# Patient Record
Sex: MALE | URBAN - NONMETROPOLITAN AREA
[De-identification: names, ages, dates, MRNs, and addresses within clinical notes are randomized per-mention and may not be internally consistent; named-entity substitution may affect disease eponyms.]

---

## 2020-03-10 ENCOUNTER — APPOINTMENT (RX ONLY)
Dept: URBAN - NONMETROPOLITAN AREA CLINIC 4 | Facility: CLINIC | Age: 70
Setting detail: DERMATOLOGY
End: 2020-03-10

## 2020-03-10 DIAGNOSIS — L82.0 INFLAMED SEBORRHEIC KERATOSIS: ICD-10-CM

## 2020-03-10 DIAGNOSIS — L30.0 NUMMULAR DERMATITIS: ICD-10-CM

## 2020-03-10 PROCEDURE — 17110 DESTRUCTION B9 LES UP TO 14: CPT

## 2020-03-10 PROCEDURE — ? LIQUID NITROGEN

## 2020-03-10 PROCEDURE — 99202 OFFICE O/P NEW SF 15 MIN: CPT | Mod: 25

## 2020-03-10 PROCEDURE — ? COUNSELING

## 2020-03-10 PROCEDURE — ? PRESCRIPTION

## 2020-03-10 PROCEDURE — ? TREATMENT REGIMEN

## 2020-03-10 RX ORDER — CLOBETASOL PROPIONATE 0.5 MG/G
0.05% OINTMENT TOPICAL BID
Qty: 1 | Refills: 3 | Status: ERX | COMMUNITY
Start: 2020-03-10

## 2020-03-10 RX ADMIN — CLOBETASOL PROPIONATE 0.05%: 0.5 OINTMENT TOPICAL at 00:00

## 2020-03-10 ASSESSMENT — LOCATION ZONE DERM
LOCATION ZONE: LEG
LOCATION ZONE: ARM
LOCATION ZONE: FACE

## 2020-03-10 ASSESSMENT — LOCATION DETAILED DESCRIPTION DERM
LOCATION DETAILED: RIGHT LATERAL FOREHEAD
LOCATION DETAILED: LEFT DISTAL PRETIBIAL REGION
LOCATION DETAILED: RIGHT ANTERIOR PROXIMAL UPPER ARM

## 2020-03-10 ASSESSMENT — SEVERITY ASSESSMENT: SEVERITY: MILD TO MODERATE

## 2020-03-10 ASSESSMENT — LOCATION SIMPLE DESCRIPTION DERM
LOCATION SIMPLE: LEFT PRETIBIAL REGION
LOCATION SIMPLE: RIGHT FOREHEAD
LOCATION SIMPLE: RIGHT UPPER ARM

## 2020-03-10 NOTE — PROCEDURE: LIQUID NITROGEN
Medical Necessity Information: It is in your best interest to select a reason for this procedure from the list below. All of these items fulfill various CMS LCD requirements except the new and changing color options.
Duration Of Freeze Thaw-Cycle (Seconds): 5-10
Render Post-Care Instructions In Note?: yes
Number Of Freeze-Thaw Cycles: 1 freeze-thaw cycle
Post-Care Instructions: I reviewed with the patient in detail post-care instructions. Patient is to wear sunprotection, and avoid picking at any of the treated lesions. Pt may apply Vaseline to crusted or scabbing areas.
Consent: The patient's consent was obtained including but not limited to risks of crusting, scabbing, blistering, scarring, darker or lighter pigmentary change, recurrence, incomplete removal and infection.
Include Z78.9 (Other Specified Conditions Influencing Health Status) As An Associated Diagnosis?: No
Detail Level: Zone
Medical Necessity Clause: This procedure was medically necessary because the lesions that were treated were:

## 2020-03-12 ENCOUNTER — RX ONLY (OUTPATIENT)
Age: 70
Setting detail: RX ONLY
End: 2020-03-12

## 2020-03-12 RX ORDER — BETAMETHASONE DIPROPIONATE 0.5 MG/G
0.05% OINTMENT TOPICAL BID
Qty: 1 | Refills: 3 | Status: ERX | COMMUNITY
Start: 2020-03-12

## 2022-03-22 ENCOUNTER — APPOINTMENT (RX ONLY)
Dept: URBAN - NONMETROPOLITAN AREA CLINIC 4 | Facility: CLINIC | Age: 72
Setting detail: DERMATOLOGY
End: 2022-03-22

## 2022-03-22 DIAGNOSIS — L30.0 NUMMULAR DERMATITIS: ICD-10-CM

## 2022-03-22 PROCEDURE — ? COUNSELING

## 2022-03-22 PROCEDURE — ? PRESCRIPTION

## 2022-03-22 PROCEDURE — ? TREATMENT REGIMEN

## 2022-03-22 PROCEDURE — 99214 OFFICE O/P EST MOD 30 MIN: CPT

## 2022-03-22 RX ORDER — CLOBETASOL PROPIONATE 0.5 MG/G
0.05% OINTMENT TOPICAL BID
Qty: 1 | Refills: 3 | Status: ERX | COMMUNITY
Start: 2022-03-22

## 2022-03-22 RX ADMIN — CLOBETASOL PROPIONATE 0.05%: 0.5 OINTMENT TOPICAL at 00:00

## 2022-03-22 ASSESSMENT — LOCATION ZONE DERM: LOCATION ZONE: LEG

## 2022-03-22 ASSESSMENT — LOCATION DETAILED DESCRIPTION DERM: LOCATION DETAILED: LEFT DISTAL PRETIBIAL REGION

## 2022-03-22 ASSESSMENT — LOCATION SIMPLE DESCRIPTION DERM: LOCATION SIMPLE: LEFT PRETIBIAL REGION

## 2022-03-29 ENCOUNTER — RX ONLY (OUTPATIENT)
Age: 72
Setting detail: RX ONLY
End: 2022-03-29

## 2022-03-29 RX ORDER — BETAMETHASONE DIPROPIONATE 0.5 MG/G
OINTMENT TOPICAL BID
Qty: 45 | Refills: 3 | Status: ERX | COMMUNITY
Start: 2022-03-29